# Patient Record
Sex: MALE | Race: BLACK OR AFRICAN AMERICAN | URBAN - METROPOLITAN AREA
[De-identification: names, ages, dates, MRNs, and addresses within clinical notes are randomized per-mention and may not be internally consistent; named-entity substitution may affect disease eponyms.]

---

## 2017-07-23 ENCOUNTER — EMERGENCY (EMERGENCY)
Facility: HOSPITAL | Age: 57
LOS: 1 days | Discharge: HOME | End: 2017-07-23
Attending: EMERGENCY MEDICINE | Admitting: EMERGENCY MEDICINE
Payer: COMMERCIAL

## 2017-07-23 VITALS
TEMPERATURE: 98 F | DIASTOLIC BLOOD PRESSURE: 87 MMHG | WEIGHT: 216.05 LBS | RESPIRATION RATE: 17 BRPM | HEIGHT: 76 IN | HEART RATE: 68 BPM | OXYGEN SATURATION: 97 % | SYSTOLIC BLOOD PRESSURE: 134 MMHG

## 2017-07-23 DIAGNOSIS — H92.03 OTALGIA, BILATERAL: ICD-10-CM

## 2017-07-23 PROCEDURE — 99282 EMERGENCY DEPT VISIT SF MDM: CPT

## 2017-07-23 NOTE — ED PROVIDER NOTE - OBJECTIVE STATEMENT
Pt w/ PMH of throat CA undergoing chemo and radiation, last chemo last month, presents to ED today c/o 6/10 bilateral ear pain stating "my ears are clogged."  Pt states he was traveling back when his companions began "shoving things in my ears", playfully, thinks just cupping hands over his ears. Now concerned for FB. Denies throat pain, URI symptoms. No trouble hearing or tinnitus

## 2017-07-23 NOTE — ED PROVIDER NOTE - MEDICAL DECISION MAKING DETAILS
here w/ ear pain bilaterally, likely from direct trauma but no findings on exam. Will have pt f/u ENT.

## 2017-07-23 NOTE — ED ADULT NURSE NOTE - OBJECTIVE STATEMENT
Pt w/ PMH of throat CA undergoing chemo and radiation presents to ED today c/o 6/10 bilateral ear pain stating "my ears are clogged."  Pt states he was traveling back when his companions began "shoving things in my ears". He cannot state what these objects were.  Pt states he now "feels something in there."  Pt denies HA, visual changes, bleeding, changes to hearing or dizziness.  Pt pending evaluation by [facility].

## 2017-07-23 NOTE — ED PROVIDER NOTE - PHYSICAL EXAMINATION
CONSTITUTIONAL: Well-appearing; well-nourished; in no apparent distress.   HEAD: Normocephalic; atraumatic.   EYES:  conjunctiva and sclera clear  ENT: normal nose; no rhinorrhea; normal pharynx with no erythema or lesions. TMs clear, easily visualized, no FB or occlusive wax  NECK: Supple; non-tender;   CARDIOVASCULAR: Normal S1, S2; no murmurs, rubs, or gallops. Regular rate and rhythm.   RESPIRATORY: Breathing easily; breath sounds clear and equal bilaterally; no wheezes, rhonchi, or rales.  GI: Soft; non-distended; non-tender; no palpable organomegaly.   EXT: No cyanosis or edema; N/V intact  SKIN: Normal for age and race; warm; dry; good turgor; no apparent lesions or rash.   NEURO: A & O x 3; face symmetric; grossly unremarkable.   PSYCHOLOGICAL: The patient’s mood and manner are appropriate.

## 2017-10-09 ENCOUNTER — EMERGENCY (EMERGENCY)
Facility: HOSPITAL | Age: 57
LOS: 1 days | Discharge: LEFT BEFORE DISCHARGE | End: 2017-10-09
Attending: EMERGENCY MEDICINE | Admitting: EMERGENCY MEDICINE

## 2017-10-09 VITALS
TEMPERATURE: 98 F | SYSTOLIC BLOOD PRESSURE: 130 MMHG | HEIGHT: 76 IN | WEIGHT: 199.96 LBS | RESPIRATION RATE: 17 BRPM | HEART RATE: 95 BPM | OXYGEN SATURATION: 99 % | DIASTOLIC BLOOD PRESSURE: 88 MMHG

## 2017-10-09 NOTE — ED ADULT TRIAGE NOTE - OTHER COMPLAINTS
pt c.o throat pain, difficulty swallowing since yesterday. hx throat ca, completed chemo/radiation. denies recent fever/chills. admits to non productive cough.

## 2017-10-13 DIAGNOSIS — R07.0 PAIN IN THROAT: ICD-10-CM

## 2017-10-13 DIAGNOSIS — R13.10 DYSPHAGIA, UNSPECIFIED: ICD-10-CM
